# Patient Record
Sex: FEMALE | NOT HISPANIC OR LATINO | Employment: FULL TIME | URBAN - METROPOLITAN AREA
[De-identification: names, ages, dates, MRNs, and addresses within clinical notes are randomized per-mention and may not be internally consistent; named-entity substitution may affect disease eponyms.]

---

## 2022-11-24 ENCOUNTER — OFFICE VISIT (OUTPATIENT)
Dept: FAMILY MEDICINE | Facility: CLINIC | Age: 32
End: 2022-11-24
Payer: COMMERCIAL

## 2022-11-24 ENCOUNTER — NURSE TRIAGE (OUTPATIENT)
Dept: NURSING | Facility: CLINIC | Age: 32
End: 2022-11-24

## 2022-11-24 ENCOUNTER — NURSE TRIAGE (OUTPATIENT)
Dept: FAMILY MEDICINE | Facility: CLINIC | Age: 32
End: 2022-11-24

## 2022-11-24 VITALS
OXYGEN SATURATION: 98 % | SYSTOLIC BLOOD PRESSURE: 100 MMHG | WEIGHT: 164 LBS | HEART RATE: 96 BPM | TEMPERATURE: 99 F | RESPIRATION RATE: 18 BRPM | DIASTOLIC BLOOD PRESSURE: 67 MMHG

## 2022-11-24 DIAGNOSIS — J06.9 VIRAL URI WITH COUGH: ICD-10-CM

## 2022-11-24 DIAGNOSIS — R06.2 WHEEZING: ICD-10-CM

## 2022-11-24 DIAGNOSIS — R52 GENERALIZED BODY ACHES: ICD-10-CM

## 2022-11-24 DIAGNOSIS — J06.9 VIRAL URI WITH COUGH: Primary | ICD-10-CM

## 2022-11-24 LAB
FLUAV AG SPEC QL IA: NEGATIVE
FLUBV AG SPEC QL IA: NEGATIVE

## 2022-11-24 PROCEDURE — 99203 OFFICE O/P NEW LOW 30 MIN: CPT | Performed by: NURSE PRACTITIONER

## 2022-11-24 PROCEDURE — 87804 INFLUENZA ASSAY W/OPTIC: CPT | Performed by: NURSE PRACTITIONER

## 2022-11-24 RX ORDER — OSELTAMIVIR PHOSPHATE 75 MG/1
75 CAPSULE ORAL 2 TIMES DAILY
Qty: 10 CAPSULE | Refills: 0 | Status: SHIPPED | OUTPATIENT
Start: 2022-11-24 | End: 2022-11-24

## 2022-11-24 RX ORDER — OSELTAMIVIR PHOSPHATE 75 MG/1
75 CAPSULE ORAL 2 TIMES DAILY
Qty: 10 CAPSULE | Refills: 0 | Status: SHIPPED | OUTPATIENT
Start: 2022-11-24 | End: 2023-04-01

## 2022-11-24 RX ORDER — ALBUTEROL SULFATE 90 UG/1
2 AEROSOL, METERED RESPIRATORY (INHALATION) ONCE
Status: COMPLETED | OUTPATIENT
Start: 2022-11-24 | End: 2022-11-24

## 2022-11-24 RX ADMIN — ALBUTEROL SULFATE 2 PUFF: 90 INHALANT RESPIRATORY (INHALATION) at 10:13

## 2022-11-24 NOTE — NURSING NOTE
Clinic Administered Medication Documentation    Administrations This Visit     albuterol (PROVENTIL HFA/VENTOLIN HFA) inhaler     Admin Date  11/24/2022 Action  Given Dose  2 puff Route  Inhalation Site   Administered By  Farzaneh Donaldson CMA    Ordering Provider: Kya Duran CNP    Patient Supplied?: No                Inhalable/Nebs Medication Documentation    Patient was given Albuterol Sulfate Inhaler. Prior to medication administration, verified patients identity using patient s name and date of birth. Please see MAR and medication order for additional information.     Expiration Date:  08/2024

## 2022-11-24 NOTE — TELEPHONE ENCOUNTER
Patient calling stating she is at [facility] and they have not received her Tamiflu prescription.    Attempting to reach [facility]. Resent Tamiflu prescription per e-prescribe due to hold time with [facility], unable to verify if they have received Rx.    Patient notified and agrees to recheck with pharmacy staff.      Marquita Gordon RN  [facility]      Reason for Disposition    [1] Prescription prescribed recently is not at pharmacy AND [2] triager has access to patient's EMR AND [3] prescription is recorded in the EMR    Additional Information    Negative: [1] Intentional drug overdose AND [2] suicidal thoughts or ideas    Negative: Drug overdose and triager unable to answer question    Negative: Caller requesting a renewal or refill of a medicine patient is currently taking    Negative: Caller requesting information unrelated to medicine    Negative: Caller requesting information about COVID-19 Vaccine    Negative: Caller requesting information about Emergency Contraception    Negative: Caller requesting information about Combined Birth Control Pills    Negative: Caller requesting information about Progestin Birth Control Pills    Negative: Caller requesting information about Post-Op pain or medicines    Negative: Caller requesting a prescription antibiotic (such as Penicillin) for Strep throat and has a positive culture result    Negative: Caller requesting a prescription anti-viral med (such as Tamiflu) and has influenza (flu) symptoms    Negative: Immunization reaction suspected    Negative: Rash while taking a medicine or within 3 days of stopping it    Negative: [1] Asthma and [2] having symptoms of asthma (cough, wheezing, etc.)    Negative: [1] Symptom of illness (e.g., headache, abdominal pain, earache, vomiting) AND [2] more than mild    Negative: Breastfeeding questions about mother's medicines and diet    Negative: MORE THAN A DOUBLE DOSE of a prescription or over-the-counter (OTC) drug     Negative: [1] DOUBLE DOSE (an extra dose or lesser amount) of prescription drug AND [2] any symptoms (e.g., dizziness, nausea, pain, sleepiness)    Negative: [1] DOUBLE DOSE (an extra dose or lesser amount) of over-the-counter (OTC) drug AND [2] any symptoms (e.g., dizziness, nausea, pain, sleepiness)    Negative: Took another person's prescription drug    Negative: [1] DOUBLE DOSE (an extra dose or lesser amount) of prescription drug AND [2] NO symptoms (Exception: a double dose of antibiotics)    Negative: Diabetes drug error or overdose (e.g., took wrong type of insulin or took extra dose)    Negative: [1] Prescription not at pharmacy AND [2] was prescribed by PCP recently (Exception: triager has access to EMR and prescription is recorded there. Go to Home Care and confirm for pharmacy.)    Negative: [1] Pharmacy calling with prescription question AND [2] triager unable to answer question    Negative: [1] Caller has URGENT medicine question about med that PCP or specialist prescribed AND [2] triager unable to answer question    Negative: Medicine patch causing local rash or itching    Negative: [1] Caller has medicine question about med NOT prescribed by PCP AND [2] triager unable to answer question (e.g., compatibility with other med, storage)    Negative: Prescription request for new medicine (not a refill)    Negative: [1] Caller has NON-URGENT medicine question about med that PCP prescribed AND [2] triager unable to answer question    Negative: Caller wants to use a complementary or alternative medicine    Protocols used: MEDICATION QUESTION CALL-A-

## 2022-11-24 NOTE — PATIENT INSTRUCTIONS
Your flu test was actually negative, but this is likely a false negative.    Use inhaler 2 puffs up to 4 times daily as needed for wheezing.  Consider lung testing at some point to see if you have lung damage from smoking.    Tamiflu soon as possible.    Come back if worsening breathing or fevers for longer than 7 days.    Fluids.    Tylenol/ibuprofen cough and cold medication such as DayQuil and NyQuil as needed    No work until fever-free, less than 100.4, for 24 hours without medication.

## 2022-11-24 NOTE — TELEPHONE ENCOUNTER
See refill encounter/11/24/22.    Marquita Gordon, RN  [facility]

## 2022-11-24 NOTE — PROGRESS NOTES
Assessment & Plan     Generalized body aches    - Influenza A & B Antigen - Clinic Collect    Wheezing    - albuterol (PROVENTIL HFA/VENTOLIN HFA) inhaler    Viral URI with cough    - oseltamivir (TAMIFLU) 75 MG capsule  Dispense: 10 capsule; Refill: 0   -Likely false negative flu.  Partner who is here tested positive for influenza A, symptoms started the day after.  -As she is quite wheezy with smoking history, we will treat her for flu with Tamiflu.   -Use inhaler 2 puffs up to 4 times daily as needed for wheezing.   -She was feeling better after test dose given in the clinic.    Focused exam and history done due to COVID-19 pandemic in a walk-in setting.      History, exam, and vital signs consistent with a viral URI.  + Wheezing throughout.      Recheck if shortness of breath or new fevers develop.  Rest.     OTCs recommended: None [   ].  Dextromethorphan  [ x ], guaifenesin [ x ], pseudoephedrine [   ], Afrin for no greater than 3 days [  ], Tylenol or ibuprofen [x  ].                Return in about 5 days (around 11/29/2022) for If no better.    Kya Duran, [facility] EMMA Lee is a 31 year old female who presents to clinic today for the following health issues:  Chief Complaint   Patient presents with     Urgent Care     URI     Per patient symptoms started yesterday body aches, cough, fatigued, fever, chills, and no appetite. Patient has been taking cough syrup, tylenol, ibuprofen and musinex. Patient did home covid test it was negative     HPI    Here with wife who has similar.  Getting sick yesterday.  Feeling wheezy.  Is a current smoker.  No known history of asthma or COPD.    Symptoms started yesterday: Body aches, cough, fatigue, fever, chills, and no appetite. Patient has been taking cough syrup, tylenol, ibuprofen and mucinex.           Review of Systems  See HPI.  Denies SOB.       Objective    /67   Pulse 96   Temp 99  F (37.2  C) (Oral)    Resp 18   Wt 74.4 kg (164 lb)   SpO2 98%   Physical Exam  Constitutional:       Appearance: Normal appearance.   HENT:      Right Ear: Tympanic membrane normal.      Left Ear: Tympanic membrane normal.      Mouth/Throat:      Pharynx: Posterior oropharyngeal erythema present.      Tonsils: No tonsillar exudate. 0 on the right. 0 on the left.   Eyes:      Conjunctiva/sclera: Conjunctivae normal.   Pulmonary:      Breath sounds: Wheezing (Expiratory wheezing throughout) present.   Skin:     General: Skin is warm.   Neurological:      Mental Status: She is alert.   Psychiatric:         Mood and Affect: Mood normal.            Results for orders placed or performed in visit on 11/24/22 (from the past 24 hour(s))   Influenza A & B Antigen - Clinic Collect    Specimen: Nose; Swab   Result Value Ref Range    Influenza A antigen Negative Negative    Influenza B antigen Negative Negative    Narrative    Test results must be correlated with clinical data. If necessary, results should be confirmed by a molecular assay or viral culture.

## 2023-02-12 ENCOUNTER — HEALTH MAINTENANCE LETTER (OUTPATIENT)
Age: 33
End: 2023-02-12

## 2023-04-01 ENCOUNTER — OFFICE VISIT (OUTPATIENT)
Dept: FAMILY MEDICINE | Facility: CLINIC | Age: 33
End: 2023-04-01
Payer: COMMERCIAL

## 2023-04-01 VITALS
DIASTOLIC BLOOD PRESSURE: 78 MMHG | OXYGEN SATURATION: 98 % | HEART RATE: 72 BPM | TEMPERATURE: 97.6 F | SYSTOLIC BLOOD PRESSURE: 119 MMHG

## 2023-04-01 DIAGNOSIS — S46.811A TRAPEZIUS STRAIN, RIGHT, INITIAL ENCOUNTER: Primary | ICD-10-CM

## 2023-04-01 PROCEDURE — 99213 OFFICE O/P EST LOW 20 MIN: CPT | Performed by: FAMILY MEDICINE

## 2023-04-01 RX ORDER — CYCLOBENZAPRINE HCL 5 MG
5-10 TABLET ORAL 3 TIMES DAILY PRN
Qty: 30 TABLET | Refills: 1 | Status: SHIPPED | OUTPATIENT
Start: 2023-04-01 | End: 2023-11-28

## 2023-04-01 NOTE — PROGRESS NOTES
"Patient presents with:  Muscle Pain: Edmonson \"pop\" in neck/shoulder on Thursday.       Clinical Decision Making:      ICD-10-CM    1. Trapezius strain, right, initial encounter  S46.811A cyclobenzaprine (FLEXERIL) 5 MG tablet     Orthopedic  Referral        32-year-old previously healthy who is here for 2-day history of right posterior shoulder pain and reduced range of motion.  No injury.  No imaging is indicated at this point in time.  Likely trapezius strain per history and examination.  Discussed heating pad Tylenol ibuprofen alternating, and muscle relaxant.  Flexeril sent to pharmacy for her.  I wrote her a letter with work restriction for the next few weeks.  Also will put in referral for orthopedic worse symptoms not improve in the next few weeks.  Return to clinic in 3 to 5 days if acutely worsening or developing any other neurological symptoms.  Patient and partner agreed with plan.    There are no Patient Instructions on file for this visit.    HPI:  Rosa Smyth is a 32 year old female who presents today complaining of   Chief Complaint   Patient presents with     Muscle Pain     Edmonson \"pop\" in neck/shoulder on Thursday.      Severe pain right posterior neck, reduced ROM to the right or upward. Pain has been constant. Movement increases the pain.  She did some heavy lifting the night before but no injury or fall.  She works in a warehouse    History obtained from the patient.    Problem List:  There are no relevant problems documented for this patient.      History reviewed. No pertinent past medical history.    Social History     Tobacco Use     Smoking status: Former     Types: Cigarettes     Quit date: 2023     Years since quittin.2     Smokeless tobacco: Never   Substance Use Topics     Alcohol use: Not on file       Review of Systems  Constitutional, HEENT, cardiovascular, pulmonary, gi and gu systems are negative, except as otherwise noted.    Vitals:    23 1409   BP: 119/78 "   BP Location: Left arm   Patient Position: Sitting   Cuff Size: Adult Regular   Pulse: 72   Temp: 97.6  F (36.4  C)   TempSrc: Tympanic   SpO2: 98%       Physical Exam  GENERAL: healthy, alert and no distress  NECK: no adenopathy, no asymmetry, masses, or scars; Decreased ROM of neck, tender to palpation of right sided upper trapezius region, full ROM of bilateral shoulders and strength 5/5 bilateral shoulder/arms. Cervical spine non-tender to palpation. Full ROM. Negative Spurlings. No atrophy noted. Non-tender to palpation over the SC joint, clavicle, AC joint, or bicipital groove. Rotator cuff strength testing 5/5 bilaterally (abduction, external and internal rotation). Negative Neer's. No winging of scapula or abnormal scapular movements noted.   RESP: lungs clear to auscultation - no rales, rhonchi or wheezes  CV: regular rate and rhythm, normal S1 S2, no S3 or S4, no murmur, click or rub  NEURO: Normal strength and tone, mentation intact and speech normal  PSYCH: mentation appears normal, affect normal/bright    Results:  No results found for any visits on 04/01/23.      At the end of the encounter, I discussed results, diagnosis, medications. Discussed red flags for immediate return to clinic/ER, as well as indications for follow up if no improvement. Patient understood and agreed to plan. Patient was stable for discharge.

## 2023-04-01 NOTE — LETTER
April 1, 2023      Rosa Smyth  [address]  [address]  [facility] [address]        To Whom It May Concern:    Rosa Smyth was seen in our clinic 04/01/23. She may return to work on Monday 4/3/2023 with the following: limited to light duty - lifting/pushing/pulling no greater than 10 pounds for the next 2 weeks. After that she can increase to no greater than 20 lbs for 2 more weeks, then after that she can return to full duty.       Sincerely,        Ruthy Lamb MD

## 2023-10-31 ENCOUNTER — OFFICE VISIT (OUTPATIENT)
Dept: URGENT CARE | Facility: URGENT CARE | Age: 33
End: 2023-10-31
Payer: COMMERCIAL

## 2023-10-31 VITALS
DIASTOLIC BLOOD PRESSURE: 74 MMHG | SYSTOLIC BLOOD PRESSURE: 138 MMHG | TEMPERATURE: 98.1 F | HEART RATE: 92 BPM | WEIGHT: 165 LBS | OXYGEN SATURATION: 99 % | RESPIRATION RATE: 14 BRPM

## 2023-10-31 DIAGNOSIS — H57.89 IRRITATION OF EYE: Primary | ICD-10-CM

## 2023-10-31 DIAGNOSIS — H15.101 EPISCLERITIS OF RIGHT EYE: ICD-10-CM

## 2023-10-31 PROCEDURE — 99213 OFFICE O/P EST LOW 20 MIN: CPT | Performed by: PHYSICIAN ASSISTANT

## 2023-10-31 RX ORDER — IBUPROFEN 800 MG/1
800 TABLET, FILM COATED ORAL EVERY 8 HOURS PRN
Qty: 30 TABLET | Refills: 0 | Status: SHIPPED | OUTPATIENT
Start: 2023-10-31

## 2023-10-31 RX ORDER — OFLOXACIN 3 MG/ML
1-2 SOLUTION/ DROPS OPHTHALMIC
Qty: 10 ML | Refills: 0 | Status: SHIPPED | OUTPATIENT
Start: 2023-10-31 | End: 2023-11-07

## 2023-11-28 ENCOUNTER — OFFICE VISIT (OUTPATIENT)
Dept: URGENT CARE | Facility: URGENT CARE | Age: 33
End: 2023-11-28
Payer: COMMERCIAL

## 2023-11-28 VITALS
HEART RATE: 69 BPM | TEMPERATURE: 97.8 F | WEIGHT: 163 LBS | OXYGEN SATURATION: 100 % | SYSTOLIC BLOOD PRESSURE: 108 MMHG | DIASTOLIC BLOOD PRESSURE: 68 MMHG

## 2023-11-28 DIAGNOSIS — R10.31 RLQ ABDOMINAL PAIN: Primary | ICD-10-CM

## 2023-11-28 DIAGNOSIS — R10.9 RIGHT FLANK PAIN: ICD-10-CM

## 2023-11-28 PROCEDURE — 99215 OFFICE O/P EST HI 40 MIN: CPT | Performed by: PHYSICIAN ASSISTANT

## 2023-11-28 NOTE — PATIENT INSTRUCTIONS
November 28, 2023 Urgent Care     Go directly to the emergency room now for your severe (10/10) right lower abdomen and right flank/back pain that began suddenly at just after 12 noon today.     Medical concern for potential kidney stone, appendicitis, torsion of right ovary     Do not eat or drink anything until the ER doctor evaluates you

## 2023-12-15 ENCOUNTER — OFFICE VISIT (OUTPATIENT)
Dept: PEDIATRICS | Facility: CLINIC | Age: 33
End: 2023-12-15
Payer: COMMERCIAL

## 2023-12-15 VITALS
WEIGHT: 167.1 LBS | BODY MASS INDEX: 25.33 KG/M2 | RESPIRATION RATE: 20 BRPM | OXYGEN SATURATION: 99 % | SYSTOLIC BLOOD PRESSURE: 100 MMHG | HEIGHT: 68 IN | HEART RATE: 70 BPM | TEMPERATURE: 97.4 F | DIASTOLIC BLOOD PRESSURE: 62 MMHG

## 2023-12-15 DIAGNOSIS — F10.11 ALCOHOL USE DISORDER, MILD, IN EARLY REMISSION: ICD-10-CM

## 2023-12-15 DIAGNOSIS — Z12.4 CERVICAL CANCER SCREENING: ICD-10-CM

## 2023-12-15 DIAGNOSIS — Z11.4 SCREENING FOR HIV (HUMAN IMMUNODEFICIENCY VIRUS): ICD-10-CM

## 2023-12-15 DIAGNOSIS — Z11.59 NEED FOR HEPATITIS C SCREENING TEST: ICD-10-CM

## 2023-12-15 DIAGNOSIS — B00.9 HSV (HERPES SIMPLEX VIRUS) INFECTION: ICD-10-CM

## 2023-12-15 DIAGNOSIS — Z13.1 SCREENING FOR DIABETES MELLITUS: ICD-10-CM

## 2023-12-15 DIAGNOSIS — Z00.00 ROUTINE GENERAL MEDICAL EXAMINATION AT A HEALTH CARE FACILITY: Primary | ICD-10-CM

## 2023-12-15 DIAGNOSIS — Z13.220 LIPID SCREENING: ICD-10-CM

## 2023-12-15 DIAGNOSIS — Z87.898 HISTORY OF INTRAVENOUS DRUG USE IN REMISSION: ICD-10-CM

## 2023-12-15 DIAGNOSIS — Z72.0 VAPES NICOTINE CONTAINING SUBSTANCE: ICD-10-CM

## 2023-12-15 LAB
ALBUMIN SERPL BCG-MCNC: 4.8 G/DL (ref 3.5–5.2)
ALP SERPL-CCNC: 59 U/L (ref 40–150)
ALT SERPL W P-5'-P-CCNC: 30 U/L (ref 0–50)
ANION GAP SERPL CALCULATED.3IONS-SCNC: 11 MMOL/L (ref 7–15)
AST SERPL W P-5'-P-CCNC: 22 U/L (ref 0–45)
BILIRUB SERPL-MCNC: 0.6 MG/DL
BUN SERPL-MCNC: 12.9 MG/DL (ref 6–20)
CALCIUM SERPL-MCNC: 9.6 MG/DL (ref 8.6–10)
CHLORIDE SERPL-SCNC: 101 MMOL/L (ref 98–107)
CHOLEST SERPL-MCNC: 217 MG/DL
CREAT SERPL-MCNC: 0.71 MG/DL (ref 0.51–0.95)
DEPRECATED HCO3 PLAS-SCNC: 27 MMOL/L (ref 22–29)
EGFRCR SERPLBLD CKD-EPI 2021: >90 ML/MIN/1.73M2
ERYTHROCYTE [DISTWIDTH] IN BLOOD BY AUTOMATED COUNT: 12.8 % (ref 10–15)
FASTING STATUS PATIENT QL REPORTED: ABNORMAL
GLUCOSE SERPL-MCNC: 103 MG/DL (ref 70–99)
HBA1C MFR BLD: 5.3 % (ref 0–5.6)
HCT VFR BLD AUTO: 41.1 % (ref 35–47)
HDLC SERPL-MCNC: 49 MG/DL
HGB BLD-MCNC: 13.3 G/DL (ref 11.7–15.7)
LDLC SERPL CALC-MCNC: 140 MG/DL
MCH RBC QN AUTO: 30.2 PG (ref 26.5–33)
MCHC RBC AUTO-ENTMCNC: 32.4 G/DL (ref 31.5–36.5)
MCV RBC AUTO: 93 FL (ref 78–100)
NONHDLC SERPL-MCNC: 168 MG/DL
PLATELET # BLD AUTO: 369 10E3/UL (ref 150–450)
POTASSIUM SERPL-SCNC: 4.3 MMOL/L (ref 3.4–5.3)
PROT SERPL-MCNC: 8 G/DL (ref 6.4–8.3)
RBC # BLD AUTO: 4.41 10E6/UL (ref 3.8–5.2)
SODIUM SERPL-SCNC: 139 MMOL/L (ref 135–145)
T PALLIDUM AB SER QL: NONREACTIVE
TRIGL SERPL-MCNC: 139 MG/DL
WBC # BLD AUTO: 5.6 10E3/UL (ref 4–11)

## 2023-12-15 PROCEDURE — 87389 HIV-1 AG W/HIV-1&-2 AB AG IA: CPT | Performed by: PHYSICIAN ASSISTANT

## 2023-12-15 PROCEDURE — 87340 HEPATITIS B SURFACE AG IA: CPT | Performed by: PHYSICIAN ASSISTANT

## 2023-12-15 PROCEDURE — 80053 COMPREHEN METABOLIC PANEL: CPT | Performed by: PHYSICIAN ASSISTANT

## 2023-12-15 PROCEDURE — 80061 LIPID PANEL: CPT | Performed by: PHYSICIAN ASSISTANT

## 2023-12-15 PROCEDURE — 87624 HPV HI-RISK TYP POOLED RSLT: CPT | Performed by: PHYSICIAN ASSISTANT

## 2023-12-15 PROCEDURE — 99395 PREV VISIT EST AGE 18-39: CPT | Performed by: PHYSICIAN ASSISTANT

## 2023-12-15 PROCEDURE — 36415 COLL VENOUS BLD VENIPUNCTURE: CPT | Performed by: PHYSICIAN ASSISTANT

## 2023-12-15 PROCEDURE — G0145 SCR C/V CYTO,THINLAYER,RESCR: HCPCS | Performed by: PHYSICIAN ASSISTANT

## 2023-12-15 PROCEDURE — 86706 HEP B SURFACE ANTIBODY: CPT | Performed by: PHYSICIAN ASSISTANT

## 2023-12-15 PROCEDURE — 83036 HEMOGLOBIN GLYCOSYLATED A1C: CPT | Performed by: PHYSICIAN ASSISTANT

## 2023-12-15 PROCEDURE — 99214 OFFICE O/P EST MOD 30 MIN: CPT | Mod: 25 | Performed by: PHYSICIAN ASSISTANT

## 2023-12-15 PROCEDURE — 86803 HEPATITIS C AB TEST: CPT | Performed by: PHYSICIAN ASSISTANT

## 2023-12-15 PROCEDURE — 85027 COMPLETE CBC AUTOMATED: CPT | Performed by: PHYSICIAN ASSISTANT

## 2023-12-15 PROCEDURE — 86780 TREPONEMA PALLIDUM: CPT | Performed by: PHYSICIAN ASSISTANT

## 2023-12-15 RX ORDER — VALACYCLOVIR HYDROCHLORIDE 1 G/1
1000 TABLET, FILM COATED ORAL DAILY
Qty: 90 TABLET | Refills: 3 | Status: SHIPPED | OUTPATIENT
Start: 2023-12-15

## 2023-12-15 ASSESSMENT — ENCOUNTER SYMPTOMS
NAUSEA: 0
DYSURIA: 0
HEMATOCHEZIA: 0
ARTHRALGIAS: 0
BREAST MASS: 0
PALPITATIONS: 0
FEVER: 0
COUGH: 0
MYALGIAS: 0
HEARTBURN: 0
HEMATURIA: 0
DIZZINESS: 0
PARESTHESIAS: 0
HEADACHES: 0
DIARRHEA: 0
CHILLS: 0
SORE THROAT: 0
ABDOMINAL PAIN: 0
FREQUENCY: 0
JOINT SWELLING: 0
WEAKNESS: 0
EYE PAIN: 0
SHORTNESS OF BREATH: 0
NERVOUS/ANXIOUS: 0
CONSTIPATION: 0

## 2023-12-15 ASSESSMENT — PAIN SCALES - GENERAL: PAINLEVEL: NO PAIN (0)

## 2023-12-15 NOTE — PATIENT INSTRUCTIONS
Making appointments with me can happen in the following ways:    Call [phone]. Depending on your needs, this can be in person or virtual.  You can also schedule appointments on your own through [facility].   If you are part of [employer], there is also an option for E-Visits when appropriate. Please follow the recommended guidelines for this.  4.   You are also welcome to schedule with my partner, Kati Whitehead.  She's an NP that works closely with me in helping my access in seeing patients when they can't get in to see me in a timely manner.     Communication with me can happen in the following ways:  Call [phone] with your concerns.  Use [facility]     The [facility] update is intended for a one-way communication to update your medical history and not intended to be a back-and-forth or for medical advice for new issues.     A reminder that an evisit is intended for any medical advice , prescription, labs or referrals that are needed.              Preventive Health Recommendations  Female Ages 26 - 39  Yearly exam:   See your health care provider every year in order to  Review health changes.   Discuss preventive care.    Review your medicines if you your doctor has prescribed any.    Until age 30: Get a Pap test every three years (more often if you have had an abnormal result).    After age 30: Talk to your doctor about whether you should have a Pap test every 3 years or have a Pap test with HPV screening every 5 years.   You do not need a Pap test if your uterus was removed (hysterectomy) and you have not had cancer.  You should be tested each year for STDs (sexually transmitted diseases), if you're at risk.   Talk to your provider about how often to have your cholesterol checked.  If you are at risk for diabetes, you should have a diabetes test (fasting glucose).  Shots: Get a flu shot each year. Get a tetanus shot every 10 years.   Nutrition:   Eat at least 5 servings of fruits and vegetables each day.  Eat  whole-grain bread, whole-wheat pasta and brown rice instead of white grains and rice.  Get adequate Calcium and Vitamin D.     Lifestyle  Exercise at least 150 minutes a week (30 minutes a day, 5 days of the week). This will help you control your weight and prevent disease.  Limit alcohol to one drink per day.  No smoking.   Wear sunscreen to prevent skin cancer.  See your dentist every six months for an exam and cleaning.    Preventive Health Recommendations  Female Ages 26 - 39  Yearly exam:   See your health care provider every year in order to  Review health changes.   Discuss preventive care.    Review your medicines if you your doctor has prescribed any.    Until age 30: Get a Pap test every three years (more often if you have had an abnormal result).    After age 30: Talk to your doctor about whether you should have a Pap test every 3 years or have a Pap test with HPV screening every 5 years.   You do not need a Pap test if your uterus was removed (hysterectomy) and you have not had cancer.  You should be tested each year for STDs (sexually transmitted diseases), if you're at risk.   Talk to your provider about how often to have your cholesterol checked.  If you are at risk for diabetes, you should have a diabetes test (fasting glucose).  Shots: Get a flu shot each year. Get a tetanus shot every 10 years.   Nutrition:   Eat at least 5 servings of fruits and vegetables each day.  Eat whole-grain bread, whole-wheat pasta and brown rice instead of white grains and rice.  Get adequate Calcium and Vitamin D.     Lifestyle  Exercise at least 150 minutes a week (30 minutes a day, 5 days of the week). This will help you control your weight and prevent disease.  Limit alcohol to one drink per day.  No smoking.   Wear sunscreen to prevent skin cancer.  See your dentist every six months for an exam and cleaning.

## 2023-12-15 NOTE — PROGRESS NOTES
SUBJECTIVE:   Rosa is a 32 year old, presenting for the following:  Physical        12/15/2023     8:16 AM   Additional Questions   Roomed by Marisol Stearns   Accompanied by Wife, Camila         12/15/2023     8:16 AM   Patient Reported Additional Medications   Patient reports taking the following new medications No       Healthy Habits:     Getting at least 3 servings of Calcium per day:  NO    Bi-annual eye exam:  NO    Dental care twice a year:  NO    Sleep apnea or symptoms of sleep apnea:  None    Diet:  Regular (no restrictions)    Frequency of exercise:  None    Taking medications regularly:  Yes    Medication side effects:  None    Additional concerns today:  No      Today's PHQ-2 Score:       12/15/2023     8:12 AM   PHQ-2 (  [employer])   Q1: Little interest or pleasure in doing things 0   Q2: Feeling down, depressed or hopeless 0   PHQ-2 Score 0   Q1: Little interest or pleasure in doing things Not at all   Q2: Feeling down, depressed or hopeless Not at all   PHQ-2 Score 0     Have you ever done Advance Care Planning? (For example, a Health Directive, POLST, or a discussion with a medical provider or your loved ones about your wishes): No, advance care planning information given to patient to review.  Patient declined advance care planning discussion at this time.    Social History     Tobacco Use    Smoking status: Former     Types: Cigarettes     Quit date: 2023     Years since quittin.9    Smokeless tobacco: Never   Substance Use Topics    Alcohol use: Yes             12/15/2023     8:11 AM   Alcohol Use   Prescreen: >3 drinks/day or >7 drinks/week? Not Applicable     Reviewed orders with patient.  Reviewed health maintenance and updated orders accordingly - Yes  Lab work is in process    Breast Cancer Screenin/15/2023     8:13 AM   Breast CA Risk Assessment (FHS-7)   Do you have a family history of breast, colon, or ovarian cancer? No / Unknown           Pertinent mammograms are  reviewed under the imaging tab.    History of abnormal Pap smear: NO - age 30-65 PAP every 5 years with negative HPV co-testing recommended     Reviewed and updated as needed this visit by clinical staff   Tobacco  Allergies  Meds   Med Hx  Surg Hx  Fam Hx          Reviewed and updated as needed this visit by Provider   Tobacco   Meds   Med Hx  Surg Hx  Fam Hx             1. New pt, has not had care for some time. Recently in emergency department for RLQ abdominal pain. Neg CT other then luteal cyst left ovary. Symptoms have resolved    2. Hx of etoh abuse. Quit three weeks ago. Her and her wife would drink 1/5 whiskey every other day. Quit cold turkery, No DT's. No nausea vomiting. No hx of tx  program. No AA. Wife supportive and has quit as well- previous to quitting,  noted relationship issues when drinking, no longer getting good feeling from it.    3. Hx of IV meth use, lortab, thc. Quit four years ago. No tx program. No cravings.     4. Hx of Genital hsv, > 12 outbreaks per year.    5. Vape- nicotine, working on cutting back. Used it to quit smoking tobacco .         Review of Systems   Constitutional:  Negative for chills and fever.   HENT:  Negative for congestion, ear pain, hearing loss and sore throat.    Eyes:  Negative for pain and visual disturbance.   Respiratory:  Negative for cough and shortness of breath.    Cardiovascular:  Negative for chest pain, palpitations and peripheral edema.   Gastrointestinal:  Negative for abdominal pain, constipation, diarrhea, heartburn, hematochezia and nausea.   Breasts:  Negative for tenderness, breast mass and discharge.   Genitourinary:  Positive for vaginal discharge. Negative for dysuria, frequency, genital sores, hematuria, pelvic pain, urgency and vaginal bleeding.   Musculoskeletal:  Negative for arthralgias, joint swelling and myalgias.   Skin:  Negative for rash.   Neurological:  Negative for dizziness, weakness, headaches and paresthesias.  "  Psychiatric/Behavioral:  Negative for mood changes. The patient is not nervous/anxious.           OBJECTIVE:   /62 (BP Location: Right arm, Patient Position: Sitting, Cuff Size: Adult Regular)   Pulse 70   Temp 97.4  F (36.3  C) (Tympanic)   Resp 20   Ht 1.727 m (5' 8\")   Wt 75.8 kg (167 lb 1.6 oz)   LMP 12/01/2023 (Approximate)   SpO2 99%   BMI 25.41 kg/m    Physical Exam  GENERAL: healthy, alert and no distress  EYES: Eyes grossly normal to inspection, PERRL and conjunctivae and sclerae normal  HENT: ear canals and TM's normal, nose and mouth without ulcers or lesions  NECK: no adenopathy, no asymmetry, masses, or scars and thyroid normal to palpation  RESP: lungs clear to auscultation - no rales, rhonchi or wheezes  CV: regular rate and rhythm, normal S1 S2, no S3 or S4, no murmur, click or rub, no peripheral edema and peripheral pulses strong  ABDOMEN: soft, nontender, no hepatosplenomegaly, no masses and bowel sounds normal   (female): normal female external genitalia, normal urethral meatus, vaginal mucosa pink, moist, well rugated, and normal cervix/adnexa/uterus without masses or discharge  MS: no gross musculoskeletal defects noted, no edema  SKIN: no suspicious lesions or rashes  NEURO: Normal strength and tone, mentation intact and speech normal  PSYCH: mentation appears normal, affect normal/bright    Diagnostic Test Results:  Labs reviewed in Epic    ASSESSMENT/PLAN:   Rosa was seen today for physical.    Diagnoses and all orders for this visit:    Routine general medical examination at a health care facility     Schedule annual exam one year.    HSV (herpes simplex virus) infection  -     valACYclovir (VALTREX) 1000 mg tablet; Take 1 tablet (1,000 mg) by mouth daily  Start valtrex daily due to number of sxs per year.    Alcohol use disorder, mild, in early remission  -     Comprehensive metabolic panel (BMP + Alb, Alk Phos, ALT, AST, Total. Bili, TP); Future  -     CBC with platelets; " "Future  -     Comprehensive metabolic panel (BMP + Alb, Alk Phos, ALT, AST, Total. Bili, TP)  -     CBC with platelets  Discussed at length. Various other options for tx- naltrexone, vivitrol etc.   Currently no cravings. Is early on in sobriety. Wife supportive and has quit as well.  Consider AA  Discussed rule 25 if need in future    Vapes nicotine containing substance  Encouraged cessation. Discussed tx with oral medication, path and gum. Will let me know if interested.    History of intravenous drug use in remission  -     Hepatitis B surface antigen; Future  -     Hepatitis B Surface Antibody; Future  -     Treponema Abs w Reflex to RPR and Titer; Future  -     Hepatitis B surface antigen  -     Hepatitis B Surface Antibody  -     Treponema Abs w Reflex to RPR and Titer  Sober 4 years.   Discussed seeking help if cravings or using.     Screening for HIV (human immunodeficiency virus)  -     HIV Antigen Antibody Combo; Future  -     HIV Antigen Antibody Combo    Need for hepatitis C screening test  -     Hepatitis C Screen Reflex to HCV RNA Quant and Genotype; Future  -     Hepatitis C Screen Reflex to HCV RNA Quant and Genotype    Cervical cancer screening  -     Pap Screen with HPV - recommended age 30 - 65 years    Screening for diabetes mellitus  -     Hemoglobin A1c; Future  -     Hemoglobin A1c    Lipid screening  -     Lipid panel reflex to direct LDL Non-fasting; Future  -     Lipid panel reflex to direct LDL Non-fasting    Other orders  -     PRIMARY CARE FOLLOW-UP SCHEDULING; Future  -     PRIMARY CARE FOLLOW-UP SCHEDULING; Future              COUNSELING:  Reviewed preventive health counseling, as reflected in patient instructions      BMI:   Estimated body mass index is 25.41 kg/m  as calculated from the following:    Height as of this encounter: 1.727 m (5' 8\").    Weight as of this encounter: 75.8 kg (167 lb 1.6 oz).   Weight management plan: Discussed healthy diet and exercise guidelines      She " reports that she quit smoking about a year ago. Her smoking use included cigarettes. She has never used smokeless tobacco.          MARTINA Najera [facility] DONTE

## 2023-12-16 LAB
HBV SURFACE AB SERPL IA-ACNC: 646.98 M[IU]/ML
HBV SURFACE AB SERPL IA-ACNC: REACTIVE M[IU]/ML
HBV SURFACE AG SERPL QL IA: NONREACTIVE
HCV AB SERPL QL IA: NONREACTIVE
HIV 1+2 AB+HIV1 P24 AG SERPL QL IA: NONREACTIVE

## 2023-12-19 LAB
BKR LAB AP GYN ADEQUACY: NORMAL
BKR LAB AP GYN INTERPRETATION: NORMAL
BKR LAB AP HPV REFLEX: NORMAL
BKR LAB AP PREVIOUS ABNORMAL: NORMAL
PATH REPORT.COMMENTS IMP SPEC: NORMAL
PATH REPORT.COMMENTS IMP SPEC: NORMAL
PATH REPORT.RELEVANT HX SPEC: NORMAL

## 2023-12-21 LAB
HUMAN PAPILLOMA VIRUS 16 DNA: NEGATIVE
HUMAN PAPILLOMA VIRUS 18 DNA: NEGATIVE
HUMAN PAPILLOMA VIRUS FINAL DIAGNOSIS: NORMAL
HUMAN PAPILLOMA VIRUS OTHER HR: NEGATIVE

## 2025-05-18 ENCOUNTER — HEALTH MAINTENANCE LETTER (OUTPATIENT)
Age: 35
End: 2025-05-18